# Patient Record
Sex: MALE | Race: WHITE | URBAN - METROPOLITAN AREA
[De-identification: names, ages, dates, MRNs, and addresses within clinical notes are randomized per-mention and may not be internally consistent; named-entity substitution may affect disease eponyms.]

---

## 2017-10-13 ENCOUNTER — TRANSFERRED RECORDS (OUTPATIENT)
Dept: HEALTH INFORMATION MANAGEMENT | Facility: CLINIC | Age: 77
End: 2017-10-13

## 2018-03-16 ENCOUNTER — TRANSFERRED RECORDS (OUTPATIENT)
Dept: HEALTH INFORMATION MANAGEMENT | Facility: CLINIC | Age: 78
End: 2018-03-16

## 2018-03-22 ENCOUNTER — TRANSFERRED RECORDS (OUTPATIENT)
Dept: HEALTH INFORMATION MANAGEMENT | Facility: CLINIC | Age: 78
End: 2018-03-22

## 2018-04-20 ENCOUNTER — TRANSFERRED RECORDS (OUTPATIENT)
Dept: HEALTH INFORMATION MANAGEMENT | Facility: CLINIC | Age: 78
End: 2018-04-20

## 2018-04-23 ENCOUNTER — TELEPHONE (OUTPATIENT)
Dept: INTERVENTIONAL RADIOLOGY/VASCULAR | Facility: CLINIC | Age: 78
End: 2018-04-23

## 2018-04-23 NOTE — TELEPHONE ENCOUNTER
Called and left a msg for pt regaring scheduling an [facility] for [facility]   If he can return my call so that I may assist him , left direct number for him.     Maye Jovel RN, BSN  Interventional Radiology Nurse Coordinator   Phone: [phone]

## 2018-04-24 ENCOUNTER — TELEPHONE (OUTPATIENT)
Dept: INTERVENTIONAL RADIOLOGY/VASCULAR | Facility: CLINIC | Age: 78
End: 2018-04-24

## 2018-04-24 DIAGNOSIS — I72.8 SPLENIC ARTERY ANEURYSM (H): Primary | ICD-10-CM

## 2018-04-24 NOTE — TELEPHONE ENCOUNTER
Called pt again and informed him that we have him scheduled for Thurs 5/10 with Dr Kim at 1040am.    Informed him of the appt details on Thurs 5/10 and that I have a letter on its way to him as well.   Informed him that we will have lab, CTA and new consult appt for him.     Should he have any other questions, then he can call me back.     He agrees to plan   Maye Jovel RN, BSN  Interventional Radiology Nurse Coordinator   Phone: [phone]

## 2018-04-24 NOTE — TELEPHONE ENCOUNTER
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   Pt lives in [place] and does drive. He states that Thursdays are best for him to come to the [facility]    Informed him that we will schedule for him to come on Thursday 5/10. We may need imaging prior to in which we will schedule and then call him back with appt details.   He agrees to plan.       *called pt back and his VM is not working. Will try again later.   Will send out letter for an appt reminder.     Maye Jovel RN, BSN  Interventional Radiology Nurse Coordinator   Phone: [phone]

## 2018-04-26 ENCOUNTER — TRANSFERRED RECORDS (OUTPATIENT)
Dept: HEALTH INFORMATION MANAGEMENT | Facility: CLINIC | Age: 78
End: 2018-04-26

## 2018-05-09 ENCOUNTER — PRE VISIT (OUTPATIENT)
Dept: VASCULAR SURGERY | Facility: CLINIC | Age: 78
End: 2018-05-09

## 2018-05-09 NOTE — TELEPHONE ENCOUNTER
Faxed PAWEL request to [facility]    Action    Action Taken Received reports from [facility], Sent to scanning on 5/11/18       Action    Action Taken [facility].

## 2018-05-10 ENCOUNTER — OFFICE VISIT (OUTPATIENT)
Dept: RADIOLOGY | Facility: CLINIC | Age: 78
End: 2018-05-10
Payer: COMMERCIAL

## 2018-05-10 ENCOUNTER — RADIANT APPOINTMENT (OUTPATIENT)
Dept: CT IMAGING | Facility: CLINIC | Age: 78
End: 2018-05-10
Attending: RADIOLOGY
Payer: COMMERCIAL

## 2018-05-10 VITALS
SYSTOLIC BLOOD PRESSURE: 126 MMHG | DIASTOLIC BLOOD PRESSURE: 70 MMHG | RESPIRATION RATE: 22 BRPM | HEART RATE: 64 BPM | OXYGEN SATURATION: 95 %

## 2018-05-10 DIAGNOSIS — I72.8 SPLENIC ARTERY ANEURYSM (H): ICD-10-CM

## 2018-05-10 DIAGNOSIS — K86.89 PANCREATIC MASS: Primary | ICD-10-CM

## 2018-05-10 LAB
ALBUMIN SERPL-MCNC: 2.4 G/DL (ref 3.4–5)
ALP SERPL-CCNC: 165 U/L (ref 40–150)
ALT SERPL W P-5'-P-CCNC: 7 U/L (ref 0–70)
ANION GAP SERPL CALCULATED.3IONS-SCNC: 7 MMOL/L (ref 3–14)
AST SERPL W P-5'-P-CCNC: 9 U/L (ref 0–45)
BILIRUB SERPL-MCNC: 0.4 MG/DL (ref 0.2–1.3)
BUN SERPL-MCNC: 23 MG/DL (ref 7–30)
CALCIUM SERPL-MCNC: 8.2 MG/DL (ref 8.5–10.1)
CHLORIDE SERPL-SCNC: 108 MMOL/L (ref 94–109)
CO2 SERPL-SCNC: 27 MMOL/L (ref 20–32)
CREAT SERPL-MCNC: 1.24 MG/DL (ref 0.66–1.25)
ERYTHROCYTE [DISTWIDTH] IN BLOOD BY AUTOMATED COUNT: 15.4 % (ref 10–15)
GFR SERPL CREATININE-BSD FRML MDRD: 56 ML/MIN/1.7M2
GLUCOSE SERPL-MCNC: 99 MG/DL (ref 70–99)
HCT VFR BLD AUTO: 34.7 % (ref 40–53)
HGB BLD-MCNC: 11 G/DL (ref 13.3–17.7)
INR PPP: 1.2 (ref 0.86–1.14)
MCH RBC QN AUTO: 29.9 PG (ref 26.5–33)
MCHC RBC AUTO-ENTMCNC: 31.7 G/DL (ref 31.5–36.5)
MCV RBC AUTO: 94 FL (ref 78–100)
PLATELET # BLD AUTO: 213 10E9/L (ref 150–450)
POTASSIUM SERPL-SCNC: 4.1 MMOL/L (ref 3.4–5.3)
PROT SERPL-MCNC: 5.9 G/DL (ref 6.8–8.8)
RBC # BLD AUTO: 3.68 10E12/L (ref 4.4–5.9)
SODIUM SERPL-SCNC: 141 MMOL/L (ref 133–144)
WBC # BLD AUTO: 7.9 10E9/L (ref 4–11)

## 2018-05-10 RX ORDER — IOPAMIDOL 755 MG/ML
100 INJECTION, SOLUTION INTRAVASCULAR ONCE
Status: COMPLETED | OUTPATIENT
Start: 2018-05-10 | End: 2018-05-10

## 2018-05-10 RX ADMIN — IOPAMIDOL 99 ML: 755 INJECTION, SOLUTION INTRAVASCULAR at 08:54

## 2018-05-10 ASSESSMENT — PAIN SCALES - GENERAL: PAINLEVEL: NO PAIN (0)

## 2018-05-10 NOTE — DISCHARGE INSTRUCTIONS

## 2018-05-10 NOTE — NURSING NOTE
Chief Complaint   Patient presents with     Consult     Consult splenic aneurysm        Vitals:    05/10/18 1056   BP: 126/70   BP Location: Left arm   Pulse: 64   Resp: 22   SpO2: 95%       There is no height or weight on file to calculate BMI.               Maria Antonia Melgar LPN

## 2018-05-10 NOTE — LETTER
5/10/2018       RE: Ivan Vaca  [address] [address]     Dear Colleague,    Thank you for referring your patient, Ivan Vaca, to the [facility] at [facility]. Please see a copy of my visit note below.        INTERVENTIONAL RADIOLOGY CONSULTATION    Name: Ivan Vaca  Age: 77 year old   Referring Physician: [facility]   REASON FOR REFERRAL: Splenic Artery Aneurysm     HPI:   77 year old male with history of a thoracoabdominal aortic aneurysm had an incidental finding of a peripherally calcified structure along the splenic artery thought to be an aneurysm.  He was sent for further evaluation to the [facility].  He has a history of pancreatitis requiring hospitalization many years ago.  He was critically ill at that time but does not recall having drains placed. He is a past smoker, and the pancreatitis was attributed to alcohol.      No history of GIB, but he does state that he had an ulcer which was iatrogenically perforated, requiring hospitalization and a drain to be placed.  This was appx 5-10 years ago.    PAST MEDICAL HISTORY:   Past Medical History:   Diagnosis Date     Basal cell carcinoma    Pancreatits  GI ulcer c/b bowel perforation.    PAST SURGICAL HISTORY:   Past Surgical History:   Procedure Laterality Date     MOHS MICROGRAPHIC PROCEDURE         FAMILY HISTORY:   He has no family history of pancreatic cancer.   Sister had colon and cervical cancer.  Another relative had a 'blood' cancer.     SOCIAL HISTORY:   Uses alcohol  Past smoker    MEDICATIONS:   Prescription Medications as of 5/10/2018             acetaminophen-codeine (TYLENOL #3) 300-30 MG per tablet Take 1-2 tablets by mouth every 4 hours as needed    albuterol (PROAIR HFA, PROVENTIL HFA, VENTOLIN HFA) 108 (90 BASE) MCG/ACT inhaler Inhale 2 puffs into the lungs every 6 hours    ALLOPURINOL PO Take 100 mg by mouth    AMLODIPINE BESYLATE PO  Take 10 mg by mouth    CEPHALEXIN PO Take 500 mg by mouth    CYANOCOBALAMIN PO Take 1,000 mcg by mouth    METOPROLOL TARTRATE PO Take 50 mg by mouth    OMEPRAZOLE PO Take 20 mg by mouth    SIMVASTATIN PO Take 20 mg by mouth      Facility Administered Medications as of 5/10/2018             iopamidol (ISOVUE-370) solution 100 mL Inject 100 mLs into the vein once          ALLERGIES:   Review of patient's allergies indicates no known allergies.      Physical Examination:   VITALS:   /70 (BP Location: Left arm)  Pulse 64  Resp 22  SpO2 95%  Constitutional: healthy, alert and no distress  Mental Status: cooperative, normal affect, no gross thought process defects during casual conversation      Labs:    BMP RESULTS:  Lab Results   Component Value Date     05/10/2018    POTASSIUM 4.1 05/10/2018    CHLORIDE 108 05/10/2018    CO2 27 05/10/2018    ANIONGAP 7 05/10/2018    GLC 99 05/10/2018    BUN 23 05/10/2018    CR 1.24 05/10/2018    GFRESTIMATED 56 (L) 05/10/2018    GFRESTBLACK 68 05/10/2018    TIAN 8.2 (L) 05/10/2018        CBC RESULTS:  Lab Results   Component Value Date    WBC 7.9 05/10/2018    RBC 3.68 (L) 05/10/2018    HGB 11.0 (L) 05/10/2018    HCT 34.7 (L) 05/10/2018    MCV 94 05/10/2018    MCH 29.9 05/10/2018    MCHC 31.7 05/10/2018    RDW 15.4 (H) 05/10/2018     05/10/2018       INR/PTT:  Lab Results   Component Value Date    INR 1.20 (H) 05/10/2018       Diagnostic studies:   CTA from today and from 5/4/2017 shows this peripherally calcified collapsed appearing (some concave margins) irregular structure located between the pancreatic tail and splenic artery is unchanged in size.  The noncontrast scan included on todays study shows only calcification without internal blood flow.  Furthermore the adjacent splenic artery looks pristine.     Noncon     Arterial phase        Assessment   1. Calcified structure adjacent to splenic artery.  Imaging features suggest that this is not an aneurysm. This  is stable on CTA over the past year. The hyperdensity on the prior CT actually is calcification rather than filling contrast.  This and the shape make a pancreatic originating structure much more likely.  This was reviewed with our Body Imagers and we favor sequela of pancreatitis such as collapsed hemorrhagic cyst, mucinous neoplasm, or other benign mass.    2. Thoracoabdominal aortic aneurysm. The visualized portion doesn't appear significantly changed over the past year.  This is followed by vascular surgery at the [facility].      Plan   1. [facility] who agreed to schedule an abdominal MRI w/wo Gd to better characterize this structure in the pancreas.    2. His thoracoabdominal aortic [facility].    Toan Burks MD  Vascular and Interventional Radiology Fellow  [facility]    I was present with the fellow during the history and exam.  I discussed the case with the fellow and agree with the findings as documented in the assessment and plan.    I spent a total of 30 minutes face-to-face with Ivan Vaca during today's office visit.  Over 50% of this time was spent counseling the patient and/or coordinating care regarding evaluation of mass adjacent to splenic artery and assessment whether or not this is a splenic artery aneurysm.  See note for details.     Chadwick Kim MD  Interventional Radiology and Vascular Imaging Attending  Department of Radiology  [facility]      CC  Patient Care Team:  Chadwick Lindsay MD as PCP - General (Internal Medicine)  Jerzy Santa MD as MD (Dermatology)

## 2018-05-11 LAB
CREAT BLD-MCNC: 1.2 MG/DL (ref 0.66–1.25)
GFR SERPL CREATININE-BSD FRML MDRD: 58 ML/MIN/1.7M2

## 2019-03-29 NOTE — PROGRESS NOTES
INTERVENTIONAL RADIOLOGY CONSULTATION    Name: Ivan Vaca  Age: 77 year old   Referring Physician: [facility] [place], operated by [facility]   REASON FOR REFERRAL: Splenic Artery Aneurysm     HPI:   77 year old male with history of a thoracoabdominal aortic aneurysm had an incidental finding of a peripherally calcified structure along the splenic artery thought to be an aneurysm.  He was sent for further evaluation to the [facility].  He has a history of pancreatitis requiring hospitalization many years ago.  He was critically ill at that time but does not recall having drains placed. He is a past smoker, and the pancreatitis was attributed to alcohol.      No history of GIB, but he does state that he had an ulcer which was iatrogenically perforated, requiring hospitalization and a drain to be placed.  This was appx 5-10 years ago.    PAST MEDICAL HISTORY:   Past Medical History:   Diagnosis Date     Basal cell carcinoma    Pancreatits  GI ulcer c/b bowel perforation.    PAST SURGICAL HISTORY:   Past Surgical History:   Procedure Laterality Date     MOHS MICROGRAPHIC PROCEDURE         FAMILY HISTORY:   He has no family history of pancreatic cancer.   Sister had colon and cervical cancer.  Another relative had a 'blood' cancer.     SOCIAL HISTORY:   Uses alcohol  Past smoker    MEDICATIONS:   Prescription Medications as of 5/10/2018             acetaminophen-codeine (TYLENOL #3) 300-30 MG per tablet Take 1-2 tablets by mouth every 4 hours as needed    albuterol (PROAIR HFA, PROVENTIL HFA, VENTOLIN HFA) 108 (90 BASE) MCG/ACT inhaler Inhale 2 puffs into the lungs every 6 hours    ALLOPURINOL PO Take 100 mg by mouth    AMLODIPINE BESYLATE PO Take 10 mg by mouth    CEPHALEXIN PO Take 500 mg by mouth    CYANOCOBALAMIN PO Take 1,000 mcg by mouth    METOPROLOL TARTRATE PO Take 50 mg by mouth    OMEPRAZOLE PO Take 20 mg by mouth    SIMVASTATIN PO Take 20 mg by mouth      Facility Administered Medications as of 5/10/2018              iopamidol (ISOVUE-370) solution 100 mL Inject 100 mLs into the vein once          ALLERGIES:   Review of patient's allergies indicates no known allergies.      Physical Examination:   VITALS:   /70 (BP Location: Left arm)  Pulse 64  Resp 22  SpO2 95%  Constitutional: healthy, alert and no distress  Mental Status: cooperative, normal affect, no gross thought process defects during casual conversation      Labs:    BMP RESULTS:  Lab Results   Component Value Date     05/10/2018    POTASSIUM 4.1 05/10/2018    CHLORIDE 108 05/10/2018    CO2 27 05/10/2018    ANIONGAP 7 05/10/2018    GLC 99 05/10/2018    BUN 23 05/10/2018    CR 1.24 05/10/2018    GFRESTIMATED 56 (L) 05/10/2018    GFRESTBLACK 68 05/10/2018    TIAN 8.2 (L) 05/10/2018        CBC RESULTS:  Lab Results   Component Value Date    WBC 7.9 05/10/2018    RBC 3.68 (L) 05/10/2018    HGB 11.0 (L) 05/10/2018    HCT 34.7 (L) 05/10/2018    MCV 94 05/10/2018    MCH 29.9 05/10/2018    MCHC 31.7 05/10/2018    RDW 15.4 (H) 05/10/2018     05/10/2018       INR/PTT:  Lab Results   Component Value Date    INR 1.20 (H) 05/10/2018       Diagnostic studies:   CTA from today and from 5/4/2017 shows this peripherally calcified collapsed appearing (some concave margins) irregular structure located between the pancreatic tail and splenic artery is unchanged in size.  The noncontrast scan included on todays study shows only calcification without internal blood flow.  Furthermore the adjacent splenic artery looks pristine.     Noncon     Arterial phase        Assessment   1. Calcified structure adjacent to splenic artery.  Imaging features suggest that this is not an aneurysm. This is stable on CTA over the past year. The hyperdensity on the prior CT actually is calcification rather than filling contrast.  This and the shape make a pancreatic originating structure much more likely.  This was reviewed with our Body Imagers and we favor sequela of pancreatitis such  as collapsed hemorrhagic cyst, mucinous neoplasm, or other benign mass.    2. Thoracoabdominal aortic aneurysm. The visualized portion doesn't appear significantly changed over the past year.  This is followed by vascular surgery at the [facility].      Plan   1. [facility], who agreed to schedule an abdominal MRI w/wo Gd to better characterize this structure in the pancreas.    2. His thoracoabdominal aortic [facility].    Toan Burks MD  Vascular and Interventional Radiology Fellow  [facility]    I was present with the fellow during the history and exam.  I discussed the case with the fellow and agree with the findings as documented in the assessment and plan.    I spent a total of 30 minutes face-to-face with Ivan Vaca during today's office visit.  Over 50% of this time was spent counseling the patient and/or coordinating care regarding evaluation of mass adjacent to splenic artery and assessment whether or not this is a splenic artery aneurysm.  See note for details.     Chadwick Kim MD  Interventional Radiology and Vascular Imaging Attending  Department of Radiology  [facility]      CC  Patient Care Team:  Chadwick Lindsay MD as PCP - General (Internal Medicine)  Jerzy Santa MD as MD (Dermatology)              : Yes